# Patient Record
(demographics unavailable — no encounter records)

---

## 2025-05-02 NOTE — ASSESSMENT
[FreeTextEntry1] : Patient is a 23 yo M who presents for f/u congenital hypospadias repair and recent UTI.  RBUS negative for stone or hydronephrosis in 2024.  LUTS stable, no recurrence of UTIs.  Feels voiding well. Again reviewed that he has a smaller ~12Fr meatus from prior congenital hypospadias repair, which could become obstructive and result in bladder dysfunction.  Also d/w pt that surgical correction may lead to complication or spraying/dribbling of urine.  D/w pt and mother that would need to assess/rule out downstream stx with RUG prior to any intervention. After discussing pros/cons, pt wishes to observe - d/w pt that would require lifelong f/u Urine dip negative.  PVR 0 cc today.   F/u in 1 yr, will obtain renal/bladder sono prior  Pt will require longitudinal care for pt's chronic/complex urologic conditions.

## 2025-05-02 NOTE — HISTORY OF PRESENT ILLNESS
[FreeTextEntry1] : Patient is a 21 yo M who presents for hypospadias, recent UTI.  Prior hx:  He has a h/o hypospadias s/p multiple repair as an infant by Dr Rivers. He did not follow up with Dr Rivers thereafter. History past obtained from his mother Brigette.  He most recently a few weeks ago was having pelvic pain, dysuria, urethral discharge. He was seen in Urgent care - urine cx showed 10-49K Klebs, GC neg. Treated with 1 wk of cipro. Took a couple weeks but feels back to normal for past 5-6 days.  Denies any urinary symptoms at baseline. No prior UTIs, no difficulty/strainign/hesitancy. FOS is good. Normal urination. AUA SS that pt completed was based on prior LUTS over past 1 month, not his historical baseline. No spraying or dribbling.  Not sexually active (never been). Reports he can obtain normal straight erections, also is able to ejaculate with masturbation.  He is legally blind.   5/2/25 Patient had renal/bladder sonogram 4/2024 which noted no hydronephrosis or stones.  He reports LUTS stable, urinary frequency varies depending on fluid intake. Denies dysuria or hematuria or episode of UTIs. He feels sensation of emptying well overall. Nocturia x 1.

## 2025-05-02 NOTE — HISTORY OF PRESENT ILLNESS
[FreeTextEntry1] : Patient is a 23 yo M who presents for hypospadias, recent UTI.  Prior hx:  He has a h/o hypospadias s/p multiple repair as an infant by Dr Rivers. He did not follow up with Dr Rivers thereafter. History past obtained from his mother Brigette.  He most recently a few weeks ago was having pelvic pain, dysuria, urethral discharge. He was seen in Urgent care - urine cx showed 10-49K Klebs, GC neg. Treated with 1 wk of cipro. Took a couple weeks but feels back to normal for past 5-6 days.  Denies any urinary symptoms at baseline. No prior UTIs, no difficulty/strainign/hesitancy. FOS is good. Normal urination. AUA SS that pt completed was based on prior LUTS over past 1 month, not his historical baseline. No spraying or dribbling.  Not sexually active (never been). Reports he can obtain normal straight erections, also is able to ejaculate with masturbation.  He is legally blind.   5/2/25 Patient had renal/bladder sonogram 4/2024 which noted no hydronephrosis or stones.  He reports LUTS stable, urinary frequency varies depending on fluid intake. Denies dysuria or hematuria or episode of UTIs. He feels sensation of emptying well overall. Nocturia x 1.